# Patient Record
Sex: FEMALE | Race: WHITE | NOT HISPANIC OR LATINO | Employment: FULL TIME
[De-identification: names, ages, dates, MRNs, and addresses within clinical notes are randomized per-mention and may not be internally consistent; named-entity substitution may affect disease eponyms.]

---

## 2017-10-20 ENCOUNTER — HEALTH MAINTENANCE LETTER (OUTPATIENT)
Age: 42
End: 2017-10-20

## 2024-03-17 ENCOUNTER — APPOINTMENT (OUTPATIENT)
Dept: GENERAL RADIOLOGY | Facility: CLINIC | Age: 49
End: 2024-03-17
Attending: PHYSICIAN ASSISTANT
Payer: COMMERCIAL

## 2024-03-17 ENCOUNTER — HOSPITAL ENCOUNTER (EMERGENCY)
Facility: CLINIC | Age: 49
Discharge: HOME | End: 2024-03-17
Attending: PHYSICIAN ASSISTANT | Admitting: PHYSICIAN ASSISTANT
Payer: COMMERCIAL

## 2024-03-17 VITALS
HEIGHT: 66 IN | HEART RATE: 93 BPM | RESPIRATION RATE: 20 BRPM | SYSTOLIC BLOOD PRESSURE: 147 MMHG | BODY MASS INDEX: 21.36 KG/M2 | WEIGHT: 132.9 LBS | OXYGEN SATURATION: 97 % | DIASTOLIC BLOOD PRESSURE: 95 MMHG | TEMPERATURE: 98.4 F

## 2024-03-17 DIAGNOSIS — S62.609A FINGER FRACTURE, RIGHT: ICD-10-CM

## 2024-03-17 DIAGNOSIS — S63.269A: ICD-10-CM

## 2024-03-17 PROCEDURE — 26742 TREAT FINGER FRACTURE EACH: CPT | Mod: RT | Performed by: PHYSICIAN ASSISTANT

## 2024-03-17 PROCEDURE — 26742 TREAT FINGER FRACTURE EACH: CPT | Mod: 54 | Performed by: PHYSICIAN ASSISTANT

## 2024-03-17 PROCEDURE — 99284 EMERGENCY DEPT VISIT MOD MDM: CPT | Mod: 25 | Performed by: PHYSICIAN ASSISTANT

## 2024-03-17 PROCEDURE — 73130 X-RAY EXAM OF HAND: CPT | Mod: RT

## 2024-03-17 PROCEDURE — 99284 EMERGENCY DEPT VISIT MOD MDM: CPT | Mod: 57 | Performed by: PHYSICIAN ASSISTANT

## 2024-03-17 PROCEDURE — 250N000013 HC RX MED GY IP 250 OP 250 PS 637: Performed by: PHYSICIAN ASSISTANT

## 2024-03-17 RX ORDER — ACETAMINOPHEN 325 MG/1
975 TABLET ORAL ONCE
Status: COMPLETED | OUTPATIENT
Start: 2024-03-17 | End: 2024-03-17

## 2024-03-17 RX ADMIN — ACETAMINOPHEN 975 MG: 325 TABLET, FILM COATED ORAL at 18:44

## 2024-03-17 ASSESSMENT — COLUMBIA-SUICIDE SEVERITY RATING SCALE - C-SSRS
6. HAVE YOU EVER DONE ANYTHING, STARTED TO DO ANYTHING, OR PREPARED TO DO ANYTHING TO END YOUR LIFE?: YES
2. HAVE YOU ACTUALLY HAD ANY THOUGHTS OF KILLING YOURSELF IN THE PAST MONTH?: NO
1. IN THE PAST MONTH, HAVE YOU WISHED YOU WERE DEAD OR WISHED YOU COULD GO TO SLEEP AND NOT WAKE UP?: NO

## 2024-03-17 ASSESSMENT — ACTIVITIES OF DAILY LIVING (ADL)
ADLS_ACUITY_SCORE: 35

## 2024-03-17 NOTE — ED TRIAGE NOTES
Fell and injured her right hand.     Triage Assessment (Adult)       Row Name 03/17/24 8577          Triage Assessment    Airway WDL WDL        Respiratory WDL    Respiratory WDL WDL        Skin Circulation/Temperature WDL    Skin Circulation/Temperature WDL WDL        Cardiac WDL    Cardiac WDL WDL        Peripheral/Neurovascular WDL    Peripheral Neurovascular WDL WDL        Cognitive/Neuro/Behavioral WDL    Cognitive/Neuro/Behavioral WDL WDL

## 2024-03-17 NOTE — ED PROVIDER NOTES
"  History     Chief Complaint   Patient presents with    Hand Injury     HPI  Rama Waters is a 49 year old female who presents for evaluation of right hand pain.  She fell just prior to arrival.  She notes deformity of her hand/finger.  Came immediately here.  Admits to drinking fair amount of alcohol today.  They were walking home when she tripped and fell.  Her fiancé brought her in for evaluation.  She denies any head or neck injury.  Denies pain elsewhere in her body.  Pain is currently rated 7 on a scale of 10.  Has not taken anything for the pain.  Denies any active bleeding.  No numbness or tingling.        Allergies:  Allergies   Allergen Reactions    Cephalosporins      Keflex       Problem List:    There are no problems to display for this patient.       Past Medical History:    Past Medical History:   Diagnosis Date    DEPRESSIVE DISORDER NEC        Past Surgical History:    Past Surgical History:   Procedure Laterality Date    ZZC LIGATE FALLOPIAN TUBE         Family History:    No family history on file.    Social History:  Marital Status:  Single [1]  Social History     Tobacco Use    Smoking status: Former    Tobacco comments:     quit 1 1/2 years ago   Substance Use Topics    Alcohol use: No        Medications:    MULTIPLE VITAMIN CAPS   OR          Review of Systems   All other systems reviewed and are negative.      Physical Exam   BP: (!) 149/93  Pulse: 103  Temp: 98.4  F (36.9  C)  Resp: 20  Height: 167.6 cm (5' 6\")  Weight: 60.3 kg (132 lb 14.4 oz)  SpO2: 98 %      Physical Exam  Vitals and nursing note reviewed.   Constitutional:       General: She is in acute distress (crying).      Appearance: She is not diaphoretic.   HENT:      Head: Normocephalic and atraumatic.      Comments: Negative Piedra sign.  No sign of facial trauma.  No dental injury.  No trismus or malocclusion.     Right Ear: External ear normal.      Left Ear: External ear normal.      Ears:      Comments: No " hemotympanum.     Nose: Nose normal.      Mouth/Throat:      Pharynx: No oropharyngeal exudate.   Eyes:      General: No scleral icterus.        Right eye: No discharge.         Left eye: No discharge.      Conjunctiva/sclera: Conjunctivae normal.      Pupils: Pupils are equal, round, and reactive to light.   Neck:      Thyroid: No thyromegaly.   Cardiovascular:      Rate and Rhythm: Normal rate and regular rhythm.      Heart sounds: Normal heart sounds. No murmur heard.  Pulmonary:      Effort: Pulmonary effort is normal. No respiratory distress.      Breath sounds: Normal breath sounds. No wheezing or rales.   Chest:      Chest wall: No tenderness.   Abdominal:      General: Bowel sounds are normal. There is no distension.      Palpations: Abdomen is soft. There is no mass.      Tenderness: There is no abdominal tenderness. There is no guarding or rebound.   Musculoskeletal:         General: Deformity present. No tenderness.      Cervical back: Normal range of motion and neck supple.      Comments: Swelling and appears to be a dorsal deformity of the distal fifth metacarpal bone or potential dislocation of the fifth MCP joint.  She does not want to move her fifth finger due to the pain.  There is a mild abrasion of the palmar aspect of the fourth finger but no active bleeding.  No other skin injury.  No tenderness of the wrist, forearm, elbow, upper arm, or shoulder.  Distal pulses 2+.  Cap refill less than 2 seconds distally.  Sensation intact to light touch.   Lymphadenopathy:      Cervical: No cervical adenopathy.   Skin:     General: Skin is warm and dry.      Capillary Refill: Capillary refill takes less than 2 seconds.      Findings: No erythema or rash.   Neurological:      Mental Status: She is alert and oriented to person, place, and time.      Cranial Nerves: No cranial nerve deficit.      Comments: Neuro:  Cranial nerves II-XII grossly intact.  Romberg is steady.  Gait WNL's.  Cerebellar testing is  WNL's.  Sensation is intact to light touch throughout.  Bicep, brachioradialis, patellar, and achilles DTR's 2/4 without clonus.  No neurological abnormality identified.    Psychiatric:         Behavior: Behavior normal.         Thought Content: Thought content normal.         ED [facility]    Procedure: 5th MCP dislocation reduction    Date/Time: 3/17/2024 11:10 PM    Performed by: Jaswinder Zamarripa PA-C  Authorized by: Jaswinder Zamarripa PA-C    Risks, benefits and alternatives discussed.       ANESTHESIA    Anesthesia:  Local infiltration  Local Anesthetic:  Bupivacaine 0.5% without epinephrine  Anesthetic Total (mL):  15      PROCEDURE  Describe Procedure: Initially, x-ray returned and there is evidence for dislocation with a small chip fracture.  Patient wanted me to reduce the dislocation without any anesthesia.  A quick attempt was made, but unfortunately the dislocated joint would not reapproximate with simple attempts.  Therefore, we proceeded with attempt at block anesthesia utilizing 0.5% bupivacaine without epinephrine.  She did not get complete anesthesia.  Regardless, she wanted me to try to reduce the joint again.  We did not have success.  At that point, asked Dr. Hanson, to trial a ulnar nerve block for which she did under ultrasound guidance.  This did help, and we attempted reduction of the joint once again without success.  There was a dimple on the palmar aspect of the skin and we are concerned that the fracture fragment is holding up the joint and not allowing us to reduce it appropriately.  We tried different techniques with recreating the dislocation and then applying pressure towards the radius to relocated in the same mechanism that it dislocated.  Unfortunately, this did not work.  We tried finger traps for a while, which did not help as well.                Critical Care time:  none               Results for orders placed or performed  during the hospital encounter of 03/17/24 (from the past 24 hour(s))   XR Hand Right G/E 3 Views    Narrative    EXAM: XR HAND RIGHT G/E 3 VIEWS  LOCATION: [facility]  DATE: 3/17/2024    INDICATION: right 5th metacarpal and 5th MCP joint pain deformity  COMPARISON: None.      Impression    IMPRESSION: Dorsal dislocation at the fifth MCP joint. There is an adjacent 1 mm ossific body, age-indeterminate. Mild soft tissue swelling.       Medications   acetaminophen (TYLENOL) tablet 975 mg (975 mg Oral $Given 3/17/24 1844)       Assessments & Plan (with Medical Decision Making)     Closed dislocation of MCP joint of hand  Finger fracture, right     49 year old female presents with her fiancé for evaluation of an injury to her right hand.  They had been drinking alcohol today and were walking home.  She tripped and fell on an outstretched hand.  Has pain, swelling, and deformity of the fifth metacarpal and fifth MCP joint area.  On exam vital signs are stable.  She is crying.  She appears to have potential dorsal deformity from either a fracture of the distal end of the fifth metacarpal or potential dislocation of the fifth MCP joint.  Neurovascular intact.  Remainder of the exam is otherwise negative.  Given Tylenol for pain.  X-ray displays dislocation of the fifth MCP with a chip fracture in the joint.  See procedure note above.  Initially, the patient wanted me to reduce the joint without anesthesia.  This was not successful.  Therefore, anesthesia with digital block and local anesthesia was performed.  We did not have complete anesthesia at that point, therefore we attempted ultrasound-guided ulnar nerve block.  This did help somewhat, but still we did not have success with multiple attempts of reduction of the joint.  I consulted orthopedics on-call, Dr. Matthews, who pulled up the chart and looked at the x-ray.  He agreed that sometimes these type of fractures/dislocations are hard  to reduce.  He described the method of how to try this, but but we had already performed that attempt without success.  I discussed the option of propofol sedation with him, but he still voiced concern that this may not reduce and that this type of fracture/dislocation may need OR management.  He felt this was not an emergent procedure and that the patient should be splinted with an ulnar gutter Ortho-Glass splint and set up with orthopedic consult during Dr. Barbosa's clinic tomorrow.  Dr. Barbosa did have a 2:20 PM appointment open.  I had a long conversation with the patient and her significant other regarding her options tonight.  I offered IV placement and propofol sedation with attempt at reduction versus conservative management with splinting and plan for orthopedic consultation tomorrow.  She did not want to be sedated tonight.  She would rather just work with orthopedics tomorrow.  Therefore, the ED tech placed her in an Ortho-Glass ulnar gutter splint.  CMS was intact afterwards.  I discussed pain management with patient including Tylenol ibuprofen.  I offered further pain medication, but she declined.  She did not feel is necessary to have narcotic pain medication for this.  ED return instructions reviewed with the patient in detail.  She is aware that she absolutely needs to see orthopedics tomorrow, as the joint was not reduced today.  She is aware that she has made the decision to not attempt further reductions which could include propofol sedation.  Her significant other was present and was part of the decision-making process and conversation as well.    Patient has had general anesthesia on multiple occasions.  Has never had an allergic reaction or any adverse issues to anesthesia.  She has felt well recently without fever, chills, or URI symptoms.  Denies any chest pain or dyspnea.  She has never had any cardiac issues.  No prior history of asthma or COPD.  Based on this physical examination and  evaluation, I determine the patient to be at low risk for general anesthesia complications and approve her for anesthesia if orthopedics decides for operative repair of her issue.    11:18 PM -I did call the patient's cell phone number, but there was no answer and no ability to leave a voicemail.  Her significant other, Ian, is a patient contact in the chart.  I called his number and he answered.  Discussed that she should not have anything to eat or drink after 8 AM tomorrow in case she would have OR management of her concern tomorrow afternoon.  He voiced understanding.     I have reviewed the nursing notes.    I have reviewed the findings, diagnosis, plan and need for follow up with the patient.           Medical Decision Making  The patient's presentation was of moderate complexity (an acute complicated injury).    The patient's evaluation involved:  ordering and/or review of 1 test(s) in this encounter (see separate area of note for details)  discussion of management or test interpretation with another health professional (see separate area of note for details)    The patient's management necessitated moderate risk (a decision regarding minor procedure (fracture/dislocation care with attempted reduction and nerve block procedures) with risk factors of none).        New Prescriptions    No medications on file       Final diagnoses:   Closed dislocation of MCP joint of hand   Finger fracture, right     Disclaimer: This note consists of symbols derived from keyboarding, dictation and/or voice recognition software. As a result, there may be errors in the script that have gone undetected. Please consider this when interpreting information found in this chart.      3/17/2024   [facility] EMERGENCY DEPT       Jaswinder Zamarripa PA-C  03/17/24 1550       Jaswnider Zamarripa PA-C  03/17/24 0788

## 2024-03-18 ENCOUNTER — OFFICE VISIT (OUTPATIENT)
Dept: ORTHOPEDICS | Facility: CLINIC | Age: 49
End: 2024-03-18
Payer: COMMERCIAL

## 2024-03-18 ENCOUNTER — ANCILLARY PROCEDURE (OUTPATIENT)
Dept: GENERAL RADIOLOGY | Facility: CLINIC | Age: 49
End: 2024-03-18
Attending: ORTHOPAEDIC SURGERY
Payer: COMMERCIAL

## 2024-03-18 VITALS — TEMPERATURE: 98 F | WEIGHT: 130 LBS | HEIGHT: 66 IN | BODY MASS INDEX: 20.89 KG/M2 | RESPIRATION RATE: 16 BRPM

## 2024-03-18 DIAGNOSIS — S63.259A: ICD-10-CM

## 2024-03-18 DIAGNOSIS — S63.259A DISLOCATION OF FINGER, INITIAL ENCOUNTER: Primary | ICD-10-CM

## 2024-03-18 PROBLEM — M79.7 FIBROMYALGIA: Status: ACTIVE | Noted: 2021-10-04

## 2024-03-18 PROBLEM — Z91.09 ENVIRONMENTAL ALLERGIES: Status: ACTIVE | Noted: 2021-10-04

## 2024-03-18 PROCEDURE — 26700 TREAT KNUCKLE DISLOCATION: CPT | Mod: F9 | Performed by: ORTHOPAEDIC SURGERY

## 2024-03-18 PROCEDURE — 99203 OFFICE O/P NEW LOW 30 MIN: CPT | Mod: 57 | Performed by: ORTHOPAEDIC SURGERY

## 2024-03-18 PROCEDURE — 73130 X-RAY EXAM OF HAND: CPT | Mod: TC | Performed by: STUDENT IN AN ORGANIZED HEALTH CARE EDUCATION/TRAINING PROGRAM

## 2024-03-18 ASSESSMENT — PAIN SCALES - GENERAL: PAINLEVEL: EXTREME PAIN (8)

## 2024-03-18 NOTE — LETTER
3/18/2024         RE: Rama Waters  [address] [place] [address]        Dear Colleague,    Thank you for referring your patient, Rama Waters, to the [facility]. Please see a copy of my visit note below.    S:Small finger dislocation which ER was unable to reduce over the weekend.  Works for [employer].         Patient Active Problem List   Diagnosis     Carpal tunnel syndrome     Environmental allergies     Fibromyalgia            Past Medical History:   Diagnosis Date     Depressive disorder, not elsewhere classified             Past Surgical History:   Procedure Laterality Date     ZZC LIGATE FALLOPIAN TUBE              Social History     Tobacco Use     Smoking status: Former     Smokeless tobacco: Never     Tobacco comments:     quit 1 1/2 years ago   Substance Use Topics     Alcohol use: No          No family history on file.            Allergies   Allergen Reactions     Cephalosporins      Keflex            Current Outpatient Medications   Medication Sig Dispense Refill     MULTIPLE VITAMIN CAPS   OR daily  0          Review Of Systems  Skin: negative  Eyes: negative  Ears/Nose/Throat: negative  Respiratory: No shortness of breath, dyspnea on exertion, cough, or hemoptysis    O:  Physical exam:Dorsal displacement small finger proximal phalanx on metacarpal with malrotation and shortening/ extension deformity.  No open wound    Lab:non contributory    Images:  EXAM: XR HAND RIGHT G/E 3 VIEWS  LOCATION: [facility]  DATE: 3/17/2024     INDICATION: right 5th metacarpal and 5th MCP joint pain deformity  COMPARISON: None.                                                                      IMPRESSION: Dorsal dislocation at the fifth MCP joint. There is an adjacent 1 mm ossific body, age-indeterminate. Mild soft tissue swelling.     XR HAND RIGHT G/E 3 VIEWS 3/18/2024 3:22 PM      HISTORY: Dislocation, finger. Post reduction.     COMPARISON:  3/17/2024.                                                                       IMPRESSION:     The fifth MCP joint is now in appropriate alignment status post  reduction. Punctate ossific fragment remains, age indeterminate.    A:  Small finger dislocation MCP with volar translation metacarpal head      P: Digital block followed by reduction, walking base of proximal phalanx along metacarpal after reproducing deformity.  Was a difficult reduction but following reduction symptoms resolved.   Extension block at R small finger MCP for 3 weeks so placed in intrinsic plus in short arm cast. Also kari taped fingers. Notify if  exacerbation symptoms.  Next visit remove cast, obtain new hand images.              In addition to the above assessment and plan each active problem on Rama's problem list was evaluated today. This included the questioning of Rama for any medication problems. We will continue the current treatment plan for these active problems except as noted.        Again, thank you for allowing me to participate in the care of your patient.        Sincerely,        Crescencio Barbosa MD

## 2024-03-18 NOTE — DISCHARGE INSTRUCTIONS
Please keep your splint on at all times.  You can ice over the splint for 20 minutes every couple hours.  It is okay to use ibuprofen 600 mg every 6 hours needed for pain.  You can also use Tylenol 975 mg every 6 hours as needed for pain.  Elevate your hand above heart level to help with the swelling and discomfort.  You have an appointment with orthopedics, Dr. Barbosa, at 2:20 PM tomorrow.  They asked that you arrive by 2:05 PM to the special Specialty door at the [facility].

## 2024-03-18 NOTE — ED PROVIDER NOTES
I was asked to assist with patient by Jaswinder Zamarripa.  I reviewed the x-ray which shows dorsal dislocation of her phalanx at the fifth MCP joint.  On exam she has good capillary refill but has a puckering of the skin on the radial aspect of the distal fifth metacarpal on the volar side.    Under ultrasound guidance and after skin prepping with alcohol, I administered 5 cc of 0.5% bupivacaine without epinephrine around the ulnar nerve at the mid forearm on the right.  Did discuss with patient prior to this risk and benefits of the procedure.  Direct visualization of the needle prevented any injection of bupivacaine into the ulnar artery.  Was able to visualize deposition of liquid near ulnar  nerve.  She had successful anesthesia distally to this.  No complications.    Despite anesthesia and multiple attempts at reduction with Jaswinder Zamarripa, we were unsuccessful at reducing the joint.  Feel that there is tissue stuck within this as evidenced by the skin puckering.  This case was discussed with orthopedic surgery by Jaswinder Zamarripa who recommended follow-up tomorrow in their clinic with the option for the operating room tomorrow.  Patient was discharged stable condition. Please see Jaswinder's note for further details on this conversation.     Noé Hanson MD  03/17/24 4776

## 2024-03-24 NOTE — PROGRESS NOTES
S:Small finger dislocation which ER was unable to reduce over the weekend.  Works for [employer].         Patient Active Problem List   Diagnosis    Carpal tunnel syndrome    Environmental allergies    Fibromyalgia            Past Medical History:   Diagnosis Date    Depressive disorder, not elsewhere classified             Past Surgical History:   Procedure Laterality Date    ZZC LIGATE FALLOPIAN TUBE              Social History     Tobacco Use    Smoking status: Former    Smokeless tobacco: Never    Tobacco comments:     quit 1 1/2 years ago   Substance Use Topics    Alcohol use: No          No family history on file.            Allergies   Allergen Reactions    Cephalosporins      Keflex            Current Outpatient Medications   Medication Sig Dispense Refill    MULTIPLE VITAMIN CAPS   OR daily  0          Review Of Systems  Skin: negative  Eyes: negative  Ears/Nose/Throat: negative  Respiratory: No shortness of breath, dyspnea on exertion, cough, or hemoptysis    O:  Physical exam:Dorsal displacement small finger proximal phalanx on metacarpal with malrotation and shortening/ extension deformity.  No open wound    Lab:non contributory    Images:  EXAM: XR HAND RIGHT G/E 3 VIEWS  LOCATION: [facility]  DATE: 3/17/2024     INDICATION: right 5th metacarpal and 5th MCP joint pain deformity  COMPARISON: None.                                                                      IMPRESSION: Dorsal dislocation at the fifth MCP joint. There is an adjacent 1 mm ossific body, age-indeterminate. Mild soft tissue swelling.     XR HAND RIGHT G/E 3 VIEWS 3/18/2024 3:22 PM      HISTORY: Dislocation, finger. Post reduction.     COMPARISON: 3/17/2024.                                                                       IMPRESSION:     The fifth MCP joint is now in appropriate alignment status post  reduction. Punctate ossific fragment remains, age indeterminate.    A:  Small finger dislocation MCP with  volar translation metacarpal head      P: Digital block followed by reduction, walking base of proximal phalanx along metacarpal after reproducing deformity.  Was a difficult reduction but following reduction symptoms resolved.   Extension block at R small finger MCP for 3 weeks so placed in intrinsic plus in short arm cast. Also kari taped fingers. Notify if  exacerbation symptoms.  Next visit remove cast, obtain new hand images.              In addition to the above assessment and plan each active problem on Rama's problem list was evaluated today. This included the questioning of Rama for any medication problems. We will continue the current treatment plan for these active problems except as noted.

## 2024-04-18 ENCOUNTER — ANCILLARY PROCEDURE (OUTPATIENT)
Dept: GENERAL RADIOLOGY | Facility: CLINIC | Age: 49
End: 2024-04-18
Attending: ORTHOPAEDIC SURGERY
Payer: COMMERCIAL

## 2024-04-18 ENCOUNTER — OFFICE VISIT (OUTPATIENT)
Dept: ORTHOPEDICS | Facility: CLINIC | Age: 49
End: 2024-04-18
Payer: COMMERCIAL

## 2024-04-18 VITALS
BODY MASS INDEX: 21.53 KG/M2 | SYSTOLIC BLOOD PRESSURE: 112 MMHG | DIASTOLIC BLOOD PRESSURE: 82 MMHG | HEIGHT: 66 IN | WEIGHT: 134 LBS

## 2024-04-18 DIAGNOSIS — S63.259A DISLOCATION OF FINGER, INITIAL ENCOUNTER: ICD-10-CM

## 2024-04-18 DIAGNOSIS — S63.259A DISLOCATION OF FINGER, INITIAL ENCOUNTER: Primary | ICD-10-CM

## 2024-04-18 PROCEDURE — 99207 PR FRACTURE CARE IN GLOBAL PERIOD: CPT | Performed by: ORTHOPAEDIC SURGERY

## 2024-04-18 PROCEDURE — 73130 X-RAY EXAM OF HAND: CPT | Mod: TC | Performed by: RADIOLOGY

## 2024-04-18 ASSESSMENT — PAIN SCALES - GENERAL: PAINLEVEL: NO PAIN (1)

## 2024-04-18 NOTE — PROGRESS NOTES
S:  History of difficult reduction R small finger MCP. Placed in short arm cast, hand intrinsic plus to act as dorsal block to extension.  No issues in cast.       Patient Active Problem List   Diagnosis    Carpal tunnel syndrome    Environmental allergies    Fibromyalgia            Past Medical History:   Diagnosis Date    Depressive disorder, not elsewhere classified             Past Surgical History:   Procedure Laterality Date    ZZC LIGATE FALLOPIAN TUBE              Social History     Tobacco Use    Smoking status: Former    Smokeless tobacco: Never    Tobacco comments:     quit 1 1/2 years ago   Substance Use Topics    Alcohol use: No          No family history on file.            Allergies   Allergen Reactions    Cephalosporins      Keflex            Current Outpatient Medications   Medication Sig Dispense Refill    MULTIPLE VITAMIN CAPS   OR daily  0          Review Of Systems  Skin: negative  Eyes: negative  Ears/Nose/Throat: negative  Respiratory: No shortness of breath, dyspnea on exertion, cough, or hemoptysis    O: Physical Exam:  No evidence malangulation/malrotation. CMS intact.  Pain to palpation about small and ring MCPs with some edema as well.  Resolving palmar ecchymosis.    Lab:non contributory    Images:EXAM: XR HAND RIGHT G/E 3 VIEWS  LOCATION: [facility]  DATE: 3/17/2024     INDICATION: right 5th metacarpal and 5th MCP joint pain deformity  COMPARISON: None.                                                                      IMPRESSION: Dorsal dislocation at the fifth MCP joint. There is an adjacent 1 mm ossific body, age-indeterminate. Mild soft tissue swelling.   XR HAND RIGHT G/E 3 VIEWS 3/18/2024 3:22 PM      HISTORY: Dislocation, finger. Post reduction.     COMPARISON: 3/17/2024.                                                                       IMPRESSION:     The fifth MCP joint is now in appropriate alignment status post  reduction. Punctate  ossific fragment remains, age indeterminate.      (Today remains reduced with adequate orientation of proximal phalanx articulation with metacarpal head, no evidence pathology)    A:  Dislocation R small finger MCP, reduced and maintenance of reduction      P:  Remove cast, repeat images  Continue kari tape 3-4 weeks  See back one month if issues  Return to activities slowly  Avoid heavy lifting over the next month             In addition to the above assessment and plan each active problem on Rama's problem list was evaluated today. This included the questioning of Rama for any medication problems. We will continue the current treatment plan for these active problems except as noted.

## 2024-04-18 NOTE — LETTER
4/18/2024         RE: Rama Waters  [address] [place] [address]        Dear Colleague,    Thank you for referring your patient, Rama Waters, to the [facility]. Please see a copy of my visit note below.    S:  History of difficult reduction R small finger MCP. Placed in short arm cast, hand intrinsic plus to act as dorsal block to extension.  No issues in cast.       Patient Active Problem List   Diagnosis     Carpal tunnel syndrome     Environmental allergies     Fibromyalgia            Past Medical History:   Diagnosis Date     Depressive disorder, not elsewhere classified             Past Surgical History:   Procedure Laterality Date     ZZC LIGATE FALLOPIAN TUBE              Social History     Tobacco Use     Smoking status: Former     Smokeless tobacco: Never     Tobacco comments:     quit 1 1/2 years ago   Substance Use Topics     Alcohol use: No          No family history on file.            Allergies   Allergen Reactions     Cephalosporins      Keflex            Current Outpatient Medications   Medication Sig Dispense Refill     MULTIPLE VITAMIN CAPS   OR daily  0          Review Of Systems  Skin: negative  Eyes: negative  Ears/Nose/Throat: negative  Respiratory: No shortness of breath, dyspnea on exertion, cough, or hemoptysis    O: Physical Exam:  No evidence malangulation/malrotation. CMS intact.  Pain to palpation about small and ring MCPs with some edema as well.  Resolving palmar ecchymosis.    Lab:non contributory    Images:EXAM: XR HAND RIGHT G/E 3 VIEWS  LOCATION: [facility]  DATE: 3/17/2024     INDICATION: right 5th metacarpal and 5th MCP joint pain deformity  COMPARISON: None.                                                                      IMPRESSION: Dorsal dislocation at the fifth MCP joint. There is an adjacent 1 mm ossific body, age-indeterminate. Mild soft tissue swelling.   XR HAND RIGHT G/E 3 VIEWS 3/18/2024  3:22 PM      HISTORY: Dislocation, finger. Post reduction.     COMPARISON: 3/17/2024.                                                                       IMPRESSION:     The fifth MCP joint is now in appropriate alignment status post  reduction. Punctate ossific fragment remains, age indeterminate.      (Today remains reduced with adequate orientation of proximal phalanx articulation with metacarpal head, no evidence pathology)    A:  Dislocation R small finger MCP, reduced and maintenance of reduction      P:  Remove cast, repeat images  Continue kari tape 3-4 weeks  See back one month if issues  Return to activities slowly  Avoid heavy lifting over the next month             In addition to the above assessment and plan each active problem on Rama's problem list was evaluated today. This included the questioning of Rama for any medication problems. We will continue the current treatment plan for these active problems except as noted.        Again, thank you for allowing me to participate in the care of your patient.        Sincerely,        Crescencio Barbosa MD

## 2024-04-18 NOTE — PATIENT INSTRUCTIONS
Thank you for choosing [facility]!      FOLLOW-UP  Dr. Barbosa would like you to follow-up in 1 month. Please stop by the  on your way out or call [phone] to schedule.

## 2024-08-03 ENCOUNTER — HEALTH MAINTENANCE LETTER (OUTPATIENT)
Age: 49
End: 2024-08-03

## 2025-08-16 ENCOUNTER — HEALTH MAINTENANCE LETTER (OUTPATIENT)
Age: 50
End: 2025-08-16